# Patient Record
Sex: FEMALE | Race: WHITE
[De-identification: names, ages, dates, MRNs, and addresses within clinical notes are randomized per-mention and may not be internally consistent; named-entity substitution may affect disease eponyms.]

---

## 2018-04-29 ENCOUNTER — HOSPITAL ENCOUNTER (INPATIENT)
Dept: HOSPITAL 82 - ED | Age: 68
Discharge: ANOTHER HOSPITAL | DRG: 101 | End: 2018-04-29
Attending: INTERNAL MEDICINE | Admitting: INTERNAL MEDICINE
Payer: MEDICARE

## 2018-04-29 VITALS — DIASTOLIC BLOOD PRESSURE: 94 MMHG | SYSTOLIC BLOOD PRESSURE: 154 MMHG

## 2018-04-29 VITALS — DIASTOLIC BLOOD PRESSURE: 92 MMHG | SYSTOLIC BLOOD PRESSURE: 152 MMHG

## 2018-04-29 VITALS — SYSTOLIC BLOOD PRESSURE: 146 MMHG | DIASTOLIC BLOOD PRESSURE: 87 MMHG

## 2018-04-29 VITALS — SYSTOLIC BLOOD PRESSURE: 166 MMHG | DIASTOLIC BLOOD PRESSURE: 90 MMHG

## 2018-04-29 VITALS — SYSTOLIC BLOOD PRESSURE: 157 MMHG | DIASTOLIC BLOOD PRESSURE: 93 MMHG

## 2018-04-29 VITALS — SYSTOLIC BLOOD PRESSURE: 144 MMHG | DIASTOLIC BLOOD PRESSURE: 86 MMHG

## 2018-04-29 VITALS — SYSTOLIC BLOOD PRESSURE: 156 MMHG | DIASTOLIC BLOOD PRESSURE: 81 MMHG

## 2018-04-29 VITALS — WEIGHT: 143.5 LBS | HEIGHT: 63 IN | BODY MASS INDEX: 25.43 KG/M2

## 2018-04-29 VITALS — DIASTOLIC BLOOD PRESSURE: 94 MMHG | SYSTOLIC BLOOD PRESSURE: 168 MMHG

## 2018-04-29 VITALS — DIASTOLIC BLOOD PRESSURE: 91 MMHG | SYSTOLIC BLOOD PRESSURE: 163 MMHG

## 2018-04-29 VITALS — SYSTOLIC BLOOD PRESSURE: 164 MMHG | DIASTOLIC BLOOD PRESSURE: 94 MMHG

## 2018-04-29 VITALS — DIASTOLIC BLOOD PRESSURE: 100 MMHG | SYSTOLIC BLOOD PRESSURE: 186 MMHG

## 2018-04-29 VITALS — DIASTOLIC BLOOD PRESSURE: 93 MMHG | SYSTOLIC BLOOD PRESSURE: 167 MMHG

## 2018-04-29 VITALS — SYSTOLIC BLOOD PRESSURE: 153 MMHG | DIASTOLIC BLOOD PRESSURE: 93 MMHG

## 2018-04-29 VITALS — SYSTOLIC BLOOD PRESSURE: 158 MMHG | DIASTOLIC BLOOD PRESSURE: 95 MMHG

## 2018-04-29 VITALS — DIASTOLIC BLOOD PRESSURE: 100 MMHG | SYSTOLIC BLOOD PRESSURE: 158 MMHG

## 2018-04-29 DIAGNOSIS — E87.0: ICD-10-CM

## 2018-04-29 DIAGNOSIS — G40.401: Primary | ICD-10-CM

## 2018-04-29 DIAGNOSIS — Z91.14: ICD-10-CM

## 2018-04-29 DIAGNOSIS — E11.9: ICD-10-CM

## 2018-04-29 DIAGNOSIS — Z79.84: ICD-10-CM

## 2018-04-29 DIAGNOSIS — E87.2: ICD-10-CM

## 2018-04-29 DIAGNOSIS — E86.0: ICD-10-CM

## 2018-04-29 LAB
ALBUMIN SERPL-MCNC: 5.3 G/DL (ref 3.2–5)
ALP SERPL-CCNC: 67 U/L (ref 38–126)
ALT SERPL-CCNC: 56 U/L (ref 11–66)
ANION GAP SERPL CALCULATED.3IONS-SCNC: 45 MMOL/L
AST SERPL-CCNC: 36 U/L (ref 9–36)
BACTERIA #/AREA URNS HPF: (no result) HPF
BARBITURATES UR-MCNC: NEGATIVE UG/ML
BASOPHILS NFR BLD AUTO: 1 % (ref 0–3)
BILIRUB UR QL STRIP.AUTO: NEGATIVE
BILIRUB UR QL STRIP.AUTO: NEGATIVE
BUN SERPL-MCNC: 7 MG/DL (ref 8–23)
BUN/CREAT SERPL: 9
CHLORIDE SERPL-SCNC: 104 MMOL/L (ref 95–108)
CLARITY UR: CLEAR
CLARITY UR: CLEAR
CO2 SERPL-SCNC: 8 MMOL/L (ref 22–30)
COCAINE UR-MCNC: NEGATIVE NG/ML
COLOR UR AUTO: YELLOW
COLOR UR AUTO: YELLOW
CREAT SERPL-MCNC: 0.9 MG/DL (ref 0.5–1)
EOSINOPHIL NFR BLD AUTO: 2 % (ref 0–8)
ERYTHROCYTE [DISTWIDTH] IN BLOOD BY AUTOMATED COUNT: 14.8 % (ref 11.5–15.5)
GLUCOSE UR STRIP.AUTO-MCNC: 250 MG/DL
GLUCOSE UR STRIP.AUTO-MCNC: NEGATIVE MG/DL
HCT VFR BLD AUTO: 48.6 % (ref 37–47)
HGB BLD-MCNC: 15.3 G/DL (ref 12–16)
HGB UR QL STRIP.AUTO: NEGATIVE
HGB UR QL STRIP.AUTO: NEGATIVE
IMM GRANULOCYTES NFR BLD: 0.3 % (ref 0–1)
KETONES UR STRIP.AUTO-MCNC: NEGATIVE MG/DL
KETONES UR STRIP.AUTO-MCNC: NEGATIVE MG/DL
LEUKOCYTE ESTERASE UR QL STRIP.AUTO: NEGATIVE
LEUKOCYTE ESTERASE UR QL STRIP.AUTO: NEGATIVE
LYMPHOCYTES NFR BLD: 52 % (ref 15–41)
MCH RBC QN AUTO: 27.8 PG  CALC (ref 26–32)
MCHC RBC AUTO-ENTMCNC: 31.5 G/L CALC (ref 32–36)
MCV RBC AUTO: 88.4 FL  CALC (ref 80–100)
METHADONE SERPL-MCNC: NEGATIVE NG/ML
MONOCYTES NFR BLD AUTO: 8 % (ref 2–13)
MYOGLOBIN SERPL-MCNC: 39 NG/ML (ref 0–62)
NEUTROPHILS # BLD AUTO: 5.65 THOU/UL (ref 2–7.15)
NEUTROPHILS NFR BLD AUTO: 37 % (ref 42–76)
NITRITE UR QL STRIP.AUTO: NEGATIVE
NITRITE UR QL STRIP.AUTO: NEGATIVE
OXCYCODONE: NEGATIVE
PH UR STRIP.AUTO: 6.5 [PH] (ref 4.5–8)
PH UR STRIP.AUTO: 6.5 [PH] (ref 4.5–8)
PLATELET # BLD AUTO: 310 THOU/UL (ref 130–400)
POTASSIUM SERPL-SCNC: 3.5 MMOL/L (ref 3.5–5.1)
PROT SERPL-MCNC: 9.2 G/DL (ref 6.3–8.2)
PROT UR QL STRIP.AUTO: (no result) MG/DL
PROT UR QL STRIP.AUTO: 100 MG/DL
RBC # BLD AUTO: 5.5 MILL/UL (ref 4.2–5.6)
RBC #/AREA URNS HPF: (no result) RBC/HPF (ref 0–5)
SODIUM SERPL-SCNC: 153 MMOL/L (ref 137–146)
SP GR UR STRIP.AUTO: 1.01
SP GR UR STRIP.AUTO: 1.02
SQUAMOUS URNS QL MICRO: (no result) EPI/HPF
TETRAHYDROCANNABIONOL: NEGATIVE
TRICYLIC ANTIDEPRESSANTS: NEGATIVE
UROBILINOGEN UR QL STRIP.AUTO: 0.2 E.U./DL
UROBILINOGEN UR QL STRIP.AUTO: 0.2 E.U./DL
WBC #/AREA URNS HPF: (no result) WBC/HPF (ref 0–5)

## 2019-02-20 ENCOUNTER — HOSPITAL ENCOUNTER (EMERGENCY)
Dept: HOSPITAL 82 - ED | Age: 69
Discharge: HOME | End: 2019-02-20
Payer: MEDICARE

## 2019-02-20 VITALS — SYSTOLIC BLOOD PRESSURE: 159 MMHG | DIASTOLIC BLOOD PRESSURE: 83 MMHG

## 2019-02-20 VITALS — HEIGHT: 63 IN | BODY MASS INDEX: 22.23 KG/M2 | WEIGHT: 125.44 LBS

## 2019-02-20 DIAGNOSIS — K85.90: Primary | ICD-10-CM

## 2019-02-20 DIAGNOSIS — E11.9: ICD-10-CM

## 2019-02-20 DIAGNOSIS — R07.9: ICD-10-CM

## 2019-02-20 DIAGNOSIS — I25.10: ICD-10-CM

## 2019-02-20 DIAGNOSIS — G40.909: ICD-10-CM

## 2019-02-20 LAB
ALBUMIN SERPL-MCNC: 4.3 G/DL (ref 3.2–5)
ALP SERPL-CCNC: 52 U/L (ref 38–126)
ANION GAP SERPL CALCULATED.3IONS-SCNC: 15 MMOL/L
AST SERPL-CCNC: 41 U/L (ref 9–36)
BASOPHILS NFR BLD AUTO: 1 % (ref 0–3)
BUN SERPL-MCNC: 9 MG/DL (ref 8–23)
BUN/CREAT SERPL: 14
CHLORIDE SERPL-SCNC: 106 MMOL/L (ref 95–108)
CO2 SERPL-SCNC: 24 MMOL/L (ref 22–30)
CREAT SERPL-MCNC: 0.6 MG/DL (ref 0.5–1)
EOSINOPHIL NFR BLD AUTO: 2 % (ref 0–8)
ERYTHROCYTE [DISTWIDTH] IN BLOOD BY AUTOMATED COUNT: 12.8 % (ref 11.5–15.5)
HCT VFR BLD AUTO: 38.9 % (ref 37–47)
HGB BLD-MCNC: 13.7 G/DL (ref 12–16)
IMM GRANULOCYTES NFR BLD: 0.4 % (ref 0–5)
LYMPHOCYTES NFR BLD: 25 % (ref 15–41)
MCH RBC QN AUTO: 31.1 PG  CALC (ref 26–32)
MCHC RBC AUTO-ENTMCNC: 35.2 G/L CALC (ref 32–36)
MCV RBC AUTO: 88.2 FL  CALC (ref 80–100)
MONOCYTES NFR BLD AUTO: 7 % (ref 2–13)
NEUTROPHILS # BLD AUTO: 5.97 THOU/UL (ref 2–7.15)
NEUTROPHILS NFR BLD AUTO: 65 % (ref 42–76)
PLATELET # BLD AUTO: 238 THOU/UL (ref 130–400)
POTASSIUM SERPL-SCNC: 3.8 MMOL/L (ref 3.5–5.1)
PROT SERPL-MCNC: 7.4 G/DL (ref 6.3–8.2)
RBC # BLD AUTO: 4.41 MILL/UL (ref 4.2–5.6)
SODIUM SERPL-SCNC: 141 MMOL/L (ref 137–146)

## 2019-06-13 ENCOUNTER — HOSPITAL ENCOUNTER (OUTPATIENT)
Dept: HOSPITAL 82 - ED | Age: 69
Setting detail: OBSERVATION
LOS: 1 days | Discharge: HOME | End: 2019-06-14
Attending: INTERNAL MEDICINE | Admitting: INTERNAL MEDICINE
Payer: MEDICARE

## 2019-06-13 VITALS — HEIGHT: 62 IN | WEIGHT: 147.71 LBS | BODY MASS INDEX: 27.18 KG/M2

## 2019-06-13 VITALS — DIASTOLIC BLOOD PRESSURE: 79 MMHG | SYSTOLIC BLOOD PRESSURE: 133 MMHG

## 2019-06-13 DIAGNOSIS — R07.9: ICD-10-CM

## 2019-06-13 DIAGNOSIS — K21.9: Primary | ICD-10-CM

## 2019-06-13 DIAGNOSIS — E11.9: ICD-10-CM

## 2019-06-13 DIAGNOSIS — G40.909: ICD-10-CM

## 2019-06-13 DIAGNOSIS — I10: ICD-10-CM

## 2019-06-13 DIAGNOSIS — I25.10: ICD-10-CM

## 2019-06-13 LAB
ALBUMIN SERPL-MCNC: 4.3 G/DL (ref 3.2–5)
ALP SERPL-CCNC: 65 U/L (ref 38–126)
ANION GAP SERPL CALCULATED.3IONS-SCNC: 15 MMOL/L
AST SERPL-CCNC: 30 U/L (ref 9–36)
BASOPHILS NFR BLD AUTO: 1 % (ref 0–3)
BILIRUB UR QL STRIP.AUTO: NEGATIVE
BUN SERPL-MCNC: 14 MG/DL (ref 8–23)
BUN/CREAT SERPL: 19
CHLORIDE SERPL-SCNC: 106 MMOL/L (ref 95–108)
CO2 SERPL-SCNC: 23 MMOL/L (ref 22–30)
COLOR UR AUTO: YELLOW
CREAT SERPL-MCNC: 0.7 MG/DL (ref 0.5–1)
EOSINOPHIL NFR BLD AUTO: 2 % (ref 0–8)
ERYTHROCYTE [DISTWIDTH] IN BLOOD BY AUTOMATED COUNT: 12.7 % (ref 11.5–15.5)
GLUCOSE UR STRIP.AUTO-MCNC: NEGATIVE MG/DL
HCT VFR BLD AUTO: 41.2 % (ref 37–47)
HGB BLD-MCNC: 14.2 G/DL (ref 12–16)
HGB UR QL STRIP.AUTO: NEGATIVE
IMM GRANULOCYTES NFR BLD: 0.4 % (ref 0–5)
KETONES UR STRIP.AUTO-MCNC: NEGATIVE MG/DL
LEUKOCYTE ESTERASE UR QL STRIP.AUTO: NEGATIVE
LYMPHOCYTES NFR BLD: 36 % (ref 15–41)
MCH RBC QN AUTO: 29.5 PG  CALC (ref 26–32)
MCHC RBC AUTO-ENTMCNC: 34.5 G/L CALC (ref 32–36)
MCV RBC AUTO: 85.7 FL  CALC (ref 80–100)
MONOCYTES NFR BLD AUTO: 8 % (ref 2–13)
MYOGLOBIN SERPL-MCNC: 15 NG/ML (ref 0–62)
NEUTROPHILS # BLD AUTO: 3.89 THOU/UL (ref 2–7.15)
NEUTROPHILS NFR BLD AUTO: 53 % (ref 42–76)
NITRITE UR QL STRIP.AUTO: NEGATIVE
PH UR STRIP.AUTO: 6 [PH] (ref 4.5–8)
PLATELET # BLD AUTO: 251 THOU/UL (ref 130–400)
POTASSIUM SERPL-SCNC: 3.3 MMOL/L (ref 3.5–5.1)
PROT SERPL-MCNC: 7.3 G/DL (ref 6.3–8.2)
PROT UR QL STRIP.AUTO: NEGATIVE MG/DL
RBC # BLD AUTO: 4.81 MILL/UL (ref 4.2–5.6)
SODIUM SERPL-SCNC: 141 MMOL/L (ref 137–146)
SP GR UR STRIP.AUTO: 1.01
UROBILINOGEN UR QL STRIP.AUTO: 0.2 E.U./DL

## 2019-06-13 PROCEDURE — S0164 INJECTION PANTROPRAZOLE: HCPCS

## 2019-06-14 VITALS — DIASTOLIC BLOOD PRESSURE: 72 MMHG | SYSTOLIC BLOOD PRESSURE: 113 MMHG

## 2019-06-14 VITALS — DIASTOLIC BLOOD PRESSURE: 75 MMHG | SYSTOLIC BLOOD PRESSURE: 122 MMHG

## 2019-06-14 VITALS — SYSTOLIC BLOOD PRESSURE: 131 MMHG | DIASTOLIC BLOOD PRESSURE: 79 MMHG

## 2019-06-14 LAB
ALBUMIN SERPL-MCNC: 4 G/DL (ref 3.2–5)
ALP SERPL-CCNC: 61 U/L (ref 38–126)
AMYLASE SERPL-CCNC: 71 U/L (ref 30–110)
ANION GAP SERPL CALCULATED.3IONS-SCNC: 14 MMOL/L
AST SERPL-CCNC: 28 U/L (ref 9–36)
BASOPHILS NFR BLD AUTO: 1 % (ref 0–3)
BUN SERPL-MCNC: 13 MG/DL (ref 8–23)
BUN/CREAT SERPL: 21
CHLORIDE SERPL-SCNC: 111 MMOL/L (ref 95–108)
CO2 SERPL-SCNC: 21 MMOL/L (ref 22–30)
CREAT SERPL-MCNC: 0.6 MG/DL (ref 0.5–1)
EOSINOPHIL NFR BLD AUTO: 4 % (ref 0–8)
ERYTHROCYTE [DISTWIDTH] IN BLOOD BY AUTOMATED COUNT: 13 % (ref 11.5–15.5)
HCT VFR BLD AUTO: 40.6 % (ref 37–47)
HGB BLD-MCNC: 13.8 G/DL (ref 12–16)
IMM GRANULOCYTES NFR BLD: 0.3 % (ref 0–5)
LIPASE SERPL-CCNC: 195 U/L (ref 23–300)
LYMPHOCYTES NFR BLD: 42 % (ref 15–41)
MAGNESIUM SERPL-MCNC: 1.9 MG/DL (ref 1.6–2.3)
MCH RBC QN AUTO: 29.7 PG  CALC (ref 26–32)
MCHC RBC AUTO-ENTMCNC: 34 G/L CALC (ref 32–36)
MCV RBC AUTO: 87.3 FL  CALC (ref 80–100)
MONOCYTES NFR BLD AUTO: 8 % (ref 2–13)
NEUTROPHILS # BLD AUTO: 3.23 THOU/UL (ref 2–7.15)
NEUTROPHILS NFR BLD AUTO: 46 % (ref 42–76)
PLATELET # BLD AUTO: 238 THOU/UL (ref 130–400)
POTASSIUM SERPL-SCNC: 3.8 MMOL/L (ref 3.5–5.1)
PROT SERPL-MCNC: 6.9 G/DL (ref 6.3–8.2)
RBC # BLD AUTO: 4.65 MILL/UL (ref 4.2–5.6)
SODIUM SERPL-SCNC: 142 MMOL/L (ref 137–146)

## 2019-10-06 ENCOUNTER — HOSPITAL ENCOUNTER (OUTPATIENT)
Dept: HOSPITAL 82 - ED | Age: 69
Setting detail: OBSERVATION
LOS: 2 days | Discharge: NURSING, REHAB OR LONG-TERM CARE | End: 2019-10-08
Attending: INTERNAL MEDICINE | Admitting: INTERNAL MEDICINE
Payer: MEDICARE

## 2019-10-06 VITALS — BODY MASS INDEX: 27.99 KG/M2 | WEIGHT: 152.12 LBS | HEIGHT: 62 IN

## 2019-10-06 VITALS — SYSTOLIC BLOOD PRESSURE: 143 MMHG | DIASTOLIC BLOOD PRESSURE: 93 MMHG

## 2019-10-06 VITALS — DIASTOLIC BLOOD PRESSURE: 85 MMHG | SYSTOLIC BLOOD PRESSURE: 136 MMHG

## 2019-10-06 DIAGNOSIS — E11.9: ICD-10-CM

## 2019-10-06 DIAGNOSIS — I25.10: ICD-10-CM

## 2019-10-06 DIAGNOSIS — F03.90: ICD-10-CM

## 2019-10-06 DIAGNOSIS — Z91.138: ICD-10-CM

## 2019-10-06 DIAGNOSIS — S00.212A: ICD-10-CM

## 2019-10-06 DIAGNOSIS — T42.6X6A: ICD-10-CM

## 2019-10-06 DIAGNOSIS — S51.812A: ICD-10-CM

## 2019-10-06 DIAGNOSIS — G40.909: ICD-10-CM

## 2019-10-06 DIAGNOSIS — S00.33XA: ICD-10-CM

## 2019-10-06 DIAGNOSIS — I10: ICD-10-CM

## 2019-10-06 DIAGNOSIS — I63.89: Primary | ICD-10-CM

## 2019-10-06 DIAGNOSIS — S51.012A: ICD-10-CM
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
## 2019-10-06 LAB
ALBUMIN SERPL-MCNC: 4.5 G/DL (ref 3.2–5)
ALP SERPL-CCNC: 65 U/L (ref 38–126)
ANION GAP SERPL CALCULATED.3IONS-SCNC: 18 MMOL/L
AST SERPL-CCNC: 29 U/L (ref 9–36)
BASOPHILS NFR BLD AUTO: 1 % (ref 0–3)
BILIRUB UR QL STRIP.AUTO: NEGATIVE
BUN SERPL-MCNC: 9 MG/DL (ref 8–23)
BUN/CREAT SERPL: 13
CHLORIDE SERPL-SCNC: 104 MMOL/L (ref 95–108)
CO2 SERPL-SCNC: 21 MMOL/L (ref 22–30)
COLOR UR AUTO: YELLOW
CREAT SERPL-MCNC: 0.7 MG/DL (ref 0.5–1)
EOSINOPHIL NFR BLD AUTO: 2 % (ref 0–8)
ERYTHROCYTE [DISTWIDTH] IN BLOOD BY AUTOMATED COUNT: 13.1 % (ref 11.5–15.5)
GLUCOSE UR STRIP.AUTO-MCNC: NEGATIVE MG/DL
HCT VFR BLD AUTO: 43 % (ref 37–47)
HGB BLD-MCNC: 14.8 G/DL (ref 12–16)
HGB UR QL STRIP.AUTO: (no result)
IMM GRANULOCYTES NFR BLD: 0.7 % (ref 0–5)
KETONES UR STRIP.AUTO-MCNC: (no result) MG/DL
LEUKOCYTE ESTERASE UR QL STRIP.AUTO: NEGATIVE
LIPASE SERPL-CCNC: 444 U/L (ref 23–300)
LYMPHOCYTES NFR BLD: 38 % (ref 15–41)
MCH RBC QN AUTO: 30 PG  CALC (ref 26–32)
MCHC RBC AUTO-ENTMCNC: 34.4 G/L CALC (ref 32–36)
MCV RBC AUTO: 87 FL  CALC (ref 80–100)
MONOCYTES NFR BLD AUTO: 5 % (ref 2–13)
NEUTROPHILS # BLD AUTO: 4.79 THOU/UL (ref 2–7.15)
NEUTROPHILS NFR BLD AUTO: 54 % (ref 42–76)
NITRITE UR QL STRIP.AUTO: NEGATIVE
PH UR STRIP.AUTO: 5 [PH] (ref 4.5–8)
PLATELET # BLD AUTO: 244 THOU/UL (ref 130–400)
POTASSIUM SERPL-SCNC: 4.1 MMOL/L (ref 3.5–5.1)
PROT SERPL-MCNC: 7.8 G/DL (ref 6.3–8.2)
PROT UR QL STRIP.AUTO: 100 MG/DL
RBC # BLD AUTO: 4.94 MILL/UL (ref 4.2–5.6)
RBC #/AREA URNS HPF: (no result) RBC/HPF (ref 0–5)
SODIUM SERPL-SCNC: 138 MMOL/L (ref 137–146)
SP GR UR STRIP.AUTO: >=1.03
SQUAMOUS URNS QL MICRO: (no result) EPI/HPF
UROBILINOGEN UR QL STRIP.AUTO: 0.2 E.U./DL

## 2019-10-06 NOTE — NUR
PT ARRIVES BY EMS APPEARING POSTICTAL.  SKIN TEARS NOTED TO LEFT FOREARM AND
ELBOW.  C-COLLAR APPLIED.

## 2019-10-06 NOTE — NUR
WRITER CALLED TO ROOM BY RN AFTER PASSING BY ROOM AND NOTICED PT HAVING
SEIZURE. PT PLACED ON LEFT SIDE AT THIS TIME. OBSERVED SEIZURE LASTED FOR
ABOUT 2-3 MINS. PT WAS INCONTINENT OF BOWEL AND BLADDER AT THIS TIME. RAPID
RESPONSE CALLED TO ROOM. SUPERVISOR, ER NURSE, AND RT IN ROOM. /104,
125, 93%. PT PLACED ON NON-REBREATHER BY RT. ORDERS RECIEVED BY ON CALL
PHYSICIAN. COMPLETE BED BATH AND LINEN CHANGE PROVIDED. RESPIRATIONS LABORED
AND PT APPEARS POSTICTAL. WILL MEDICATE AS SOON AS MEDICATIONS PROFILED AND
CONTINUE TO MONITOR.

## 2019-10-06 NOTE — NUR
PT TAKEN TO ROOM 291 WITHOUT INCIDENT, REPORT TO LISA.  PT CONTINUES
LETHARGIC, ANSWERS QUESTIONS WHEN PRESSED.

## 2019-10-06 NOTE — NUR
REPORT FROM LISA VILLANUEVA. PT RESTING IN BED. ALERT AND ORIENTED TO SELF AT
THIS TIME. RESPONSE TIME IS A LITTLE SLOW. PT DENIES ANY PAIN OR DISCOMFORT.
NO APPARENT DISTRESS NOTED. SEIZURE PRECAUTIONS AND BED ALARM IN PLACE. CALL
LIGHT WITHIN REACH. WILL CONTINUE TO MONITOR.

## 2019-10-06 NOTE — NUR
CURRENT /75, 127, 98%, 24. IV ATIVAN ADMINISTERED. WAITING FOR IV KEPPRA
AT THIS TIME WILL ADMINISTER WHEN ARRIVES.

## 2019-10-06 NOTE — NUR
PT COMES TO THE FLOOR VIA STRETCHER WITH STAFF IN STABLE BUT LETHARGIC
CONDITION. PT IS PULLED FROM STRECHER TO BED x3 STAFF. PT ABLE TO TELL ME NAME
AND . SLOW TO ANSWER. PT FOLLOWS COMMANDS ABLE TO MOVE ALL EXTREMITIES IN A
SLOW MANNER. VS OBTAINED. BED ALARM IN PLACE AND RAILS PADDED FOR PT SAFETY.
CALL KARIMI IN REACH. WILL CONTINUE TO MONITOR.
PT

## 2019-10-06 NOTE — NUR
PT RESTING IN BED WITH EYES CLOSED. PT ALERT TO SPEECH BUT REMAINS NONVERBAL.
NO APPARENT DISTRESS NOTED. CALL LIGHT WITHIN REACH AND BED ALARM FOR SAFETY.
WILL CONTINUE TO MONITOR.

## 2019-10-07 VITALS — SYSTOLIC BLOOD PRESSURE: 142 MMHG | DIASTOLIC BLOOD PRESSURE: 89 MMHG

## 2019-10-07 VITALS — SYSTOLIC BLOOD PRESSURE: 155 MMHG | DIASTOLIC BLOOD PRESSURE: 94 MMHG

## 2019-10-07 VITALS — DIASTOLIC BLOOD PRESSURE: 85 MMHG | SYSTOLIC BLOOD PRESSURE: 156 MMHG

## 2019-10-07 VITALS — DIASTOLIC BLOOD PRESSURE: 93 MMHG | SYSTOLIC BLOOD PRESSURE: 163 MMHG

## 2019-10-07 VITALS — SYSTOLIC BLOOD PRESSURE: 145 MMHG | DIASTOLIC BLOOD PRESSURE: 94 MMHG

## 2019-10-07 VITALS — SYSTOLIC BLOOD PRESSURE: 152 MMHG | DIASTOLIC BLOOD PRESSURE: 97 MMHG

## 2019-10-07 NOTE — NUR
REPORT RECEIVED FROM KAY BRADFORD;PT RESTING IN SEMI FOWLERS POSITION;OPENS
EYES TO VERBAL STIMULI BUT REMAINS NON-VERBAL AT THIS TIME;PT HAD A SEIZURE
LAST NIGHT 10/06/19 AND HAS REMAINED POSTICTAL SINCE;RESPIRATIONS APPEAR EVEN
AND UNLABORED ON O2 @ 2L VIA NC;NO S/S OF DISTRESS NOTED;TELE MONITORING IN
PLACE;IV FLUIDS INFUSING TO LEFT WRIST @ 100ML/HR WITH EASE;SEIZURE
PRECAUTIONS NOTED;FALL PRECAUTIONS NOTED WITH BED IN THE LOWEST POSITION AND
BED ALARM ON FOR SAFETY;CALL LIGHT IN REACH;WILL CONTINUE TO MONITOR

## 2019-10-07 NOTE — NUR
PT ARRIVED BACK TO MED/SURG ROOM 291 IN STABLE CONDITION VIA STRETCHER
ACCOMPANIED BY ION AND CHINO DAVILA;PT RE-POSITIONED INTO HOSPITAL
BED;RESPIRATIONS EVEN AND UNLABORED ON O2 @ 2L VIA NC;TELE MONITORING IN
PLACE;IV SITE TO LEFT WRIST PATENT,NS RE-STARTED @ 100ML/HR,SITE APPEARS
HEALTHY;SAFETY PRECAUTIONS REINFORCED WITH BED ALARM ON FOR SAFETY;CALL LIGHT
IN REACH;WILL CONTINUE TO MONITOR

## 2019-10-07 NOTE — NUR
PT RESTING IN SEMI FOWLERS POSITION;REMAINS NON-VERBAL BUT IS ABLE TO OPEN HER
EYES TO VERBAL STIMULI AND MOVE BUE AND BLE AROUND BED;RESPIRATIONS REMAIN
SHALLOW ON O2 @ 2L VIA NC;NO S/S OF DISTRESS NOTED;TELE MONITORING IN PLACE;IV
FLUIDS CONTINUE AT 100ML/HR,SITE APPEARS HEALTHY;SEIZURE PRECAUTIONS
BED ALARM REMAINS ON FOR PT SAFETY;CALL LIGHT IN REACH;WILL CONTINUE TO
MONITOR

## 2019-10-07 NOTE — NUR
RECEIVED REPORT FROM NURSE CHILO, PATIENT AWAKE, LAYING IN RT SIDE, REMAINS ON
SEIZURE PRECAUTION, EVEN UNLABORED BREATHING CALL LIGHT AT REACH.

## 2019-10-07 NOTE — NUR
CALLED [facility] AT [phone] SPOKE TO EVANGELISTA. GAVE PT INFORMATION AND
WAS GIVEN AN ETA AT AROUND 3 HOURS DUE TO ONE TRUCK BEING AVAILABLE. WRITER
WAS TOLD IF THEY GET ANOTHER AVAILABLE THEY WILL CALL.

## 2019-10-07 NOTE — NUR
PT RESTING IN SEMI FOWLERS POSITION;CONTINUES TO OPEN HER EYES WITH VERBAL
STIMULI BUT REMAINS NON-VERBAL;EYES PERRLA;VS OBTAINED AND ASSESSMENT
COMPLETED;NO S/S OF DISTRESS NOTED;RESPIRATIONS EVEN AND UNLABORED,SHALLOW ON
O2 @ 2L VIA NC,CLEAR LUNG SOUNDS;ABDOMEN SOFT ON PALPATION AND ACTIVE IN ALL 4
QUADRANTS;STRONG PEDAL PULSES;SKIN TEAR NOTED TO LEFT FOREARM, AND GENERALIZED
BRUISING NOTED TO FACE AND ABDOMEN FROM FALL PTA;#22G TO LEFT WRIST INFUSING
NS @ 100ML/HR,SITE APPEARS HEALTHY;TELE MONITORING IN PLACE;SEIZURE
PRECAUTIONS NOTED;ALL SAFETY PRECAUTIONS IN PLACE WITH BED IN THE LOWEST
POSITION AND BED ALARM ON FOR SAFETY;CALL LIGHT IN REACH;WILL CONTINUE TO
MONITOR

## 2019-10-07 NOTE — NUR
PT REMAINS POSTICTAL AND NON VERBAL AT THIS TIME. WRITER NOT ABLE TO COMPLETE
ADMISSION ASSESSMENT PART A. WILL REPORT TO DAY SHIFT.

## 2019-10-07 NOTE — NUR
PT RESTING IN BED WITH EYES CLOSED. NO APPARENT DISTRESS NOTED. PT ALERT TO
SPEECH WITH SLOW VERBAL REPONSE. CALL LIGHT WITHIN REACH AND BED ALARM FOR
SAFETY.

## 2019-10-07 NOTE — NUR
ATTEMPTED TO COMPLETE MED REC, BUT WAS UNSUCCESSFUL. PT WAS UNRESPONSIVE.
SPOKE WITH NURSE WOO AND NO ADDITIONAL INFORMATION IS AVAILABLE AT THIS
TIME.

## 2019-10-07 NOTE — NUR
PATIENT CURRENTLY RESTING IN BED, ABLE TO OPEN EYES TO VERBAL STIMULI, STILL
NON VERBAL LETHARGIC, REMAINS ON SEIZURE PRECAUTION AND FALL PRECAUTION, EVEN
UNLABORED BREATHING AT THIS TIME,  WITH AN ONGOING IV OF NS @100CC/HR INFUSING
WELL, REMAINS ON TELE, AWAITING [facility] FOR HOSPITAL TRANSFER.

## 2019-10-07 NOTE — NUR
PT DAUGHTER MADDY NOTIFIED OF [employer] FINDINGS AND AGREES TO TRANSFER PT TO
[facility]. NOTIFIED.AWAITING CALL BACK FROM MD
REGARDING ACCEPTING PHYSICAN.

## 2019-10-07 NOTE — NUR
CALL RECEIVED FROM GENE MUHAMMAD,NURSING SUPERVISOR AT [facility].PT TO BE TRANSFERRED [facility] ROOM 313 TCU.

## 2019-10-08 NOTE — NUR
[facility] ARRIVED AT 0421, PATIENT TRANSPORTED VIA STRETCHER, WITH EVEN
UNLABORED BREATHING,  ABLE TO OPEN EYES TO VERBAL STIMULI. CALLED PRE[facility] ALLEN @ 0439 AND SPOKE TO NURSE KRYSTIN FROM TCU NURSE TO NURSE REPORT GIVEN.

## 2019-10-08 NOTE — NUR
RECEIVED A PHONE CALL FROM [facility] SPOKE TO SUPERVISOR SABRA
AND INFORMED HIM THAT WE ARE STILL WAITING FOR [facility] TRANSPORT.

## 2019-10-08 NOTE — NUR
PATIENT IV SITE LEAKING REMOVED CATHETER, CATHETER INTACT, NEW IV SITE STARTED
ON RT WRIST PATENT FLUSHES WELL, PATIENT MORE ALERT AT THIS TIME, ABLE TO SAY
NO OR YES, EVEN UNLABORED BREATHING, TURNED AND REPOSITION, INCONTINENT CARE
DONE.

## 2019-10-08 NOTE — NUR
PATIENT OPENS EYES TO VERBAL STIMULI, APPEARS TO BE LETHARGIC, EVEN UNLABORED
BREATHING, CALL LIGHT AT REACH.